# Patient Record
Sex: MALE | Race: BLACK OR AFRICAN AMERICAN | NOT HISPANIC OR LATINO | Employment: FULL TIME | URBAN - METROPOLITAN AREA
[De-identification: names, ages, dates, MRNs, and addresses within clinical notes are randomized per-mention and may not be internally consistent; named-entity substitution may affect disease eponyms.]

---

## 2023-08-31 ENCOUNTER — OFFICE VISIT (OUTPATIENT)
Dept: PRIMARY CARE | Facility: CLINIC | Age: 38
End: 2023-08-31
Payer: COMMERCIAL

## 2023-08-31 VITALS
WEIGHT: 162 LBS | HEART RATE: 63 BPM | BODY MASS INDEX: 25.43 KG/M2 | SYSTOLIC BLOOD PRESSURE: 131 MMHG | HEIGHT: 67 IN | DIASTOLIC BLOOD PRESSURE: 69 MMHG

## 2023-08-31 DIAGNOSIS — R79.89 ABNORMAL TSH: ICD-10-CM

## 2023-08-31 DIAGNOSIS — F84.0 AUTISTIC DISORDER (HHS-HCC): ICD-10-CM

## 2023-08-31 DIAGNOSIS — F32.5 MAJOR DEPRESSIVE DISORDER WITH SINGLE EPISODE, IN REMISSION (CMS-HCC): ICD-10-CM

## 2023-08-31 DIAGNOSIS — E78.5 HYPERLIPIDEMIA, UNSPECIFIED HYPERLIPIDEMIA TYPE: ICD-10-CM

## 2023-08-31 DIAGNOSIS — J30.1 ALLERGIC RHINITIS DUE TO POLLEN, UNSPECIFIED SEASONALITY: ICD-10-CM

## 2023-08-31 DIAGNOSIS — Z00.00 HEALTH CARE MAINTENANCE: Primary | ICD-10-CM

## 2023-08-31 PROBLEM — M22.10 RECURRENT SUBLUXATION OF PATELLA: Status: ACTIVE | Noted: 2023-08-31

## 2023-08-31 PROBLEM — J30.9 ALLERGIC RHINITIS: Status: ACTIVE | Noted: 2023-08-31

## 2023-08-31 PROBLEM — M22.12 PATELLOFEMORAL INSTABILITY, LEFT: Status: ACTIVE | Noted: 2023-08-31

## 2023-08-31 PROBLEM — F41.9 ANXIETY: Status: ACTIVE | Noted: 2023-08-31

## 2023-08-31 PROCEDURE — 1036F TOBACCO NON-USER: CPT | Performed by: INTERNAL MEDICINE

## 2023-08-31 PROCEDURE — 99213 OFFICE O/P EST LOW 20 MIN: CPT | Performed by: INTERNAL MEDICINE

## 2023-08-31 RX ORDER — CITALOPRAM 20 MG/1
20 TABLET, FILM COATED ORAL DAILY
COMMUNITY

## 2023-08-31 RX ORDER — QUETIAPINE FUMARATE 50 MG/1
50 TABLET, FILM COATED ORAL DAILY
COMMUNITY
Start: 2023-02-06

## 2023-08-31 RX ORDER — FLUTICASONE PROPIONATE 50 MCG
1 SPRAY, SUSPENSION (ML) NASAL DAILY
Qty: 16 G | Refills: 5 | Status: SHIPPED | OUTPATIENT
Start: 2023-08-31 | End: 2024-08-30

## 2023-08-31 ASSESSMENT — PATIENT HEALTH QUESTIONNAIRE - PHQ9
2. FEELING DOWN, DEPRESSED OR HOPELESS: NOT AT ALL
SUM OF ALL RESPONSES TO PHQ9 QUESTIONS 1 AND 2: 0
1. LITTLE INTEREST OR PLEASURE IN DOING THINGS: NOT AT ALL

## 2023-08-31 NOTE — PROGRESS NOTES
"Subjective   Patient ID: Percy Pruitt is a 38 y.o. male who presents for [facility].    HPI     Patient is a 38-year-old male with past medical history of autism anxiety and depression who follows with psychiatry every 4 months who presents to [facility].  Patient has a history of allergic rhinitis and is requesting refills of his Flonase.  He denies any watery eyes and he states that for the most part his symptoms are well controlled.  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  He lives independently however he is mother is present to assist with care.  He does not have a guardian at this time.  His mother wants him to be independent as possible but is there to help with decisions.  He denies any chest pain palpitations nausea vomiting diarrhea or constipation    Review of Systems  Constitutional: No fever or chills  Cardiovascular: no chest pain, no palpitations and no syncope.   Respiratory: no cough, no shortness of breath during exertion and no shortness of breath at rest.   Gastrointestinal: no abdominal pain, no nausea and no vomiting.  Neuro: No Headache, no dizziness    Objective   /69   Pulse 63   Ht 1.702 m (5' 7\")   Wt 73.5 kg (162 lb)   BMI 25.37 kg/m²     Physical Exam  Constitutional: Alert and in no acute distress. Well developed, well nourished  Head and Face: Head and face: Normal.    Cardiovascular: Heart rate and rhythm were normal, normal S1 and S2. No peripheral edema.   Pulmonary: No respiratory distress. Clear bilateral breath sounds.  Musculoskeletal: Gait and station: Normal. Muscle strength/tone: Normal.   Skin: Normal skin color and pigmentation, normal skin turgor, and no rash.    Psychiatric: Judgment and insight: Intact. Mood and affect: Normal.        Lab Results   Component Value Date    WBC 8.7 10/01/2021    HGB 15.7 10/01/2021    HCT 49.0 10/01/2021     10/01/2021    CHOL 215 (H) 05/30/2019    TRIG 79 05/30/2019 "    HDL 49.6 05/30/2019    LDLDIRECT 135 (H) 10/01/2021    ALT 14 05/30/2019    AST 22 05/30/2019     10/01/2021    K 4.7 10/01/2021     10/01/2021    CREATININE 1.09 10/01/2021    BUN 10 10/01/2021    CO2 30 10/01/2021    TSH 0.53 10/01/2021    HGBA1C 5.2 10/01/2021       XR KNEE COMPLETE 4 OR MORE VIEWS  Exam Type:  KNEE CMPLT, 4 OR MORE VIEWS   Date and Time: 7/21/2016 4:02 PM   Clinical information: Signs/Symptoms: left knee pain at patella. no   surgery no trauma   Comparison: None.        FINDINGS:     No joint effusion.  Tibial spines are prominent.  No fracture or   dislocation.  No radiopaque foreign bodies in the soft tissues.    Patellofemoral compartment is normal in appearance.     IMPRESSION:     1. Essentially negative study.            Assessment/Plan   Problem List Items Addressed This Visit       Autistic disorder    Allergic rhinitis     Refill Flonase to be used once daily         Relevant Medications    fluticasone (Flonase) 50 mcg/actuation nasal spray    Abnormal TSH     Slightly elevated 5 years ago.  We will recheck at this time.  Clinically euthyroid         Hyperlipidemia     Check fasting LDL  Encourage Mediterranean diet and regular exercise         Major depressive disorder with single episode, in remission (CMS/HCC)     Continue following with psychiatry.  On Seroquel and Celexa.  Mood stable.            Other Visit Diagnoses       Health care maintenance    -  Primary    Relevant Orders    CBC    Comprehensive metabolic panel    Lipid Panel    TSH with reflex to Free T4 if abnormal

## 2023-09-01 LAB
ALANINE AMINOTRANSFERASE (SGPT) (U/L) IN SER/PLAS: 18 U/L (ref 10–52)
ALBUMIN (G/DL) IN SER/PLAS: 4.7 G/DL (ref 3.4–5)
ALKALINE PHOSPHATASE (U/L) IN SER/PLAS: 60 U/L (ref 33–120)
ANION GAP IN SER/PLAS: 14 MMOL/L (ref 10–20)
ASPARTATE AMINOTRANSFERASE (SGOT) (U/L) IN SER/PLAS: 20 U/L (ref 9–39)
BILIRUBIN TOTAL (MG/DL) IN SER/PLAS: 0.9 MG/DL (ref 0–1.2)
CALCIUM (MG/DL) IN SER/PLAS: 10.4 MG/DL (ref 8.6–10.6)
CARBON DIOXIDE, TOTAL (MMOL/L) IN SER/PLAS: 29 MMOL/L (ref 21–32)
CHLORIDE (MMOL/L) IN SER/PLAS: 102 MMOL/L (ref 98–107)
CHOLESTEROL (MG/DL) IN SER/PLAS: 217 MG/DL (ref 0–199)
CHOLESTEROL IN HDL (MG/DL) IN SER/PLAS: 61.2 MG/DL
CHOLESTEROL/HDL RATIO: 3.5
CREATININE (MG/DL) IN SER/PLAS: 1.09 MG/DL (ref 0.5–1.3)
ERYTHROCYTE DISTRIBUTION WIDTH (RATIO) BY AUTOMATED COUNT: 13.5 % (ref 11.5–14.5)
ERYTHROCYTE MEAN CORPUSCULAR HEMOGLOBIN CONCENTRATION (G/DL) BY AUTOMATED: 31.9 G/DL (ref 32–36)
ERYTHROCYTE MEAN CORPUSCULAR VOLUME (FL) BY AUTOMATED COUNT: 83 FL (ref 80–100)
ERYTHROCYTES (10*6/UL) IN BLOOD BY AUTOMATED COUNT: 6.18 X10E12/L (ref 4.5–5.9)
GFR MALE: 90 ML/MIN/1.73M2
GLUCOSE (MG/DL) IN SER/PLAS: 80 MG/DL (ref 74–99)
HEMATOCRIT (%) IN BLOOD BY AUTOMATED COUNT: 51.4 % (ref 41–52)
HEMOGLOBIN (G/DL) IN BLOOD: 16.4 G/DL (ref 13.5–17.5)
LDL: 141 MG/DL (ref 0–99)
LEUKOCYTES (10*3/UL) IN BLOOD BY AUTOMATED COUNT: 6.8 X10E9/L (ref 4.4–11.3)
NRBC (PER 100 WBCS) BY AUTOMATED COUNT: 0 /100 WBC (ref 0–0)
PLATELETS (10*3/UL) IN BLOOD AUTOMATED COUNT: 184 X10E9/L (ref 150–450)
POTASSIUM (MMOL/L) IN SER/PLAS: 4.7 MMOL/L (ref 3.5–5.3)
PROTEIN TOTAL: 7.6 G/DL (ref 6.4–8.2)
SODIUM (MMOL/L) IN SER/PLAS: 140 MMOL/L (ref 136–145)
THYROTROPIN (MIU/L) IN SER/PLAS BY DETECTION LIMIT <= 0.05 MIU/L: 0.84 MIU/L (ref 0.44–3.98)
TRIGLYCERIDE (MG/DL) IN SER/PLAS: 76 MG/DL (ref 0–149)
UREA NITROGEN (MG/DL) IN SER/PLAS: 9 MG/DL (ref 6–23)
VLDL: 15 MG/DL (ref 0–40)

## 2023-09-01 PROCEDURE — 85027 COMPLETE CBC AUTOMATED: CPT

## 2023-09-01 PROCEDURE — 80053 COMPREHEN METABOLIC PANEL: CPT

## 2023-09-01 PROCEDURE — 84443 ASSAY THYROID STIM HORMONE: CPT

## 2023-09-01 PROCEDURE — 80061 LIPID PANEL: CPT

## 2024-03-21 ENCOUNTER — OFFICE VISIT (OUTPATIENT)
Dept: PRIMARY CARE | Facility: CLINIC | Age: 39
End: 2024-03-21
Payer: COMMERCIAL

## 2024-03-21 VITALS
WEIGHT: 156 LBS | DIASTOLIC BLOOD PRESSURE: 64 MMHG | SYSTOLIC BLOOD PRESSURE: 111 MMHG | BODY MASS INDEX: 24.43 KG/M2 | OXYGEN SATURATION: 97 % | HEART RATE: 60 BPM

## 2024-03-21 DIAGNOSIS — F84.0 AUTISTIC DISORDER (HHS-HCC): ICD-10-CM

## 2024-03-21 DIAGNOSIS — E78.5 HYPERLIPIDEMIA, UNSPECIFIED HYPERLIPIDEMIA TYPE: Primary | ICD-10-CM

## 2024-03-21 DIAGNOSIS — F32.5 MAJOR DEPRESSIVE DISORDER WITH SINGLE EPISODE, IN REMISSION (CMS-HCC): ICD-10-CM

## 2024-03-21 PROCEDURE — 99395 PREV VISIT EST AGE 18-39: CPT | Performed by: INTERNAL MEDICINE

## 2024-03-21 PROCEDURE — 1036F TOBACCO NON-USER: CPT | Performed by: INTERNAL MEDICINE

## 2024-03-21 RX ORDER — ATORVASTATIN CALCIUM 20 MG/1
20 TABLET, FILM COATED ORAL DAILY
Qty: 100 TABLET | Refills: 3 | Status: SHIPPED | OUTPATIENT
Start: 2024-03-21 | End: 2025-04-25

## 2024-03-21 NOTE — PROGRESS NOTES
Subjective   Patient ID: Percy Pruitt is a 38 y.o. male who presents for Follow-up.          HPI     Patient is a 38-year-old male with past medical history of autism anxiety and depression Asperger and audism  who follows with psychiatry every 4 months who presents for wellness.    No specific complaints at this time.  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  Lives independently at home.  His mother is present to assist with needs if needed.  He pays his own bills.      His cholesterol has been elevated over the years and his grandmother was diagnosed with early heart disease.  His mother also has history of heart disease.  No shortness of breath on exertion and no chest pain.  He otherwise takes his medications as prescribed and feels like the psychiatric medications are helping him.    No suicidal homicidal ideation.  Labs are up-to-date.  He does not exercise regularly.  He denies illicit substances smoking or alcohol    Review of Systems  Constitutional: No fever or chills, No Night Sweats  Eyes: No Blurry Vision or Eye sight problems  ENT: No Nasal Discharge, Hoarseness, sore throat  Cardiovascular: no chest pain, no palpitations and no syncope.   Respiratory: no cough, no shortness of breath during exertion and no shortness of breath at rest.   Gastrointestinal: no abdominal pain, no nausea and no vomiting.   : No issues with urinary stream, burning with urination, no blood in urine or stools  Skin: No Skin rashes or Lesions  Neuro: No Headache, no dizziness or Numbness or tingling  Psych: No Anxiety, depression or sleeping problems  Heme: No Easy bleeding or brusing.     Objective   /64   Pulse 60   Wt 70.8 kg (156 lb)   SpO2 97%   BMI 24.43 kg/m²     Physical Exam  Constitutional: Alert and in no acute distress. Well developed, well nourished.   Head and Face: Head and face: Normal.    Eyes: Normal external exam. Pupils were equal in size,  round, reactive to light (PERRL) with normal accommodation and extraocular movements intact (EOMI).   Ears, Nose, Mouth, and Throat: External inspection of ears and nose: Normal.  Hearing: Normal.  Nasal mucosa, septum, and turbinates: Normal.  Lips, teeth, and gums: Normal.  Oropharynx: Normal.   Neck: No neck mass was observed. Supple. Thyroid not enlarged and there were no palpable thyroid nodules.   Cardiovascular: Heart rate and rhythm were normal, normal S1 and S2. Pedal pulses: Normal. No peripheral edema.   Pulmonary: No respiratory distress. Clear bilateral breath sounds.   Abdomen: Soft nontender; no abdominal mass palpated. Normal bowel sounds. No organomegaly.   : Deferred  Musculoskeletal: No joint swelling seen, normal movements of all extremities. Range of motion: Normal.  Muscle strength/tone: Normal.    Skin: Normal skin color and pigmentation, normal skin turgor, and no rash.   Neurologic: Deep tendon reflexes were 2+ and symmetric.   Psychiatric: Judgment and insight: Intact. Mood and affect: Normal.  Lymphatic: No cervical lymphadenopathy. Palpation of lymph nodes in axillae: Normal.  Palpation of lymph nodes in groin: Normal.    Lab Results   Component Value Date    WBC 6.8 09/01/2023    HGB 16.4 09/01/2023    HCT 51.4 09/01/2023     09/01/2023    CHOL 217 (H) 09/01/2023    TRIG 76 09/01/2023    HDL 61.2 09/01/2023    LDLDIRECT 135 (H) 10/01/2021    ALT 18 09/01/2023    AST 20 09/01/2023     09/01/2023    K 4.7 09/01/2023     09/01/2023    CREATININE 1.09 09/01/2023    BUN 9 09/01/2023    CO2 29 09/01/2023    TSH 0.84 09/01/2023    HGBA1C 5.2 10/01/2021       XR KNEE COMPLETE 4 OR MORE VIEWS  Exam Type:  KNEE CMPLT, 4 OR MORE VIEWS   Date and Time: 7/21/2016 4:02 PM   Clinical information: Signs/Symptoms: left knee pain at patella. no   surgery no trauma   Comparison: None.        FINDINGS:     No joint effusion.  Tibial spines are prominent.  No fracture or   dislocation.   No radiopaque foreign bodies in the soft tissues.    Patellofemoral compartment is normal in appearance.     IMPRESSION:     1. Essentially negative study.      Assessment/Plan   Problem List Items Addressed This Visit             ICD-10-CM    Autistic disorder F84.0    Hyperlipidemia - Primary E78.5     Start statin and advised Mediterranean diet         Relevant Medications    atorvastatin (Lipitor) 20 mg tablet    Major depressive disorder with single episode, in remission (CMS/HCC) F32.5     Symptoms well-controlled and following with psychiatry on a regular basis.              Dear Percy Pruitt     It was my pleasure to take care of you today in the office. Below are the things we discussed today:    1. Immunizations: Yearly Flu shot is recommended.          a: Tetanus: Up-to-date          2. Blood Work: Ordered  3. Seen your dentist twice a year  4. Yearly Eye exam is recommended    5. BMI: 24.4  6: Diet recommendations:   Eat Clean, Try to have as many home cooked meals as possible  Avoid processed foods which contain excess calories, sugar, and sodium.    7. Exercise recommendations:   150 minutes a week to maintain your weight     If you have to loose weight, you need a better diet and exercise plan.     8. Please get your Living will / Advance directive completed if you do not have one already. Please make sure our office has a copy of the latest one.     9. Colonoscopy: Start at age 45  10. PSA: Start at age 50    11.     Follow up in one year for a Physical. Please call the office before your Physical to see if you need blood work completed prior to your physical.     Please call me if any questions arise from now until your next visit. I will call you after I am done seeing patients. A Doctor is always available by phone when the office is closed. Please feel free to call for help with any problem that you feel shouldn't wait until the office re-opens.     Melo Jolly, DO

## 2024-11-14 ENCOUNTER — LAB (OUTPATIENT)
Dept: LAB | Facility: LAB | Age: 39
End: 2024-11-14
Payer: COMMERCIAL

## 2024-11-14 DIAGNOSIS — Z51.81 ENCOUNTER FOR THERAPEUTIC DRUG LEVEL MONITORING: Primary | ICD-10-CM

## 2024-11-14 LAB
CHOLEST SERPL-MCNC: 201 MG/DL (ref 0–199)
CHOLESTEROL/HDL RATIO: 3.3
EST. AVERAGE GLUCOSE BLD GHB EST-MCNC: 111 MG/DL
HBA1C MFR BLD: 5.5 %
HDLC SERPL-MCNC: 61.8 MG/DL
LDLC SERPL CALC-MCNC: 128 MG/DL
NON HDL CHOLESTEROL: 139 MG/DL (ref 0–149)
TRIGL SERPL-MCNC: 58 MG/DL (ref 0–149)
VLDL: 12 MG/DL (ref 0–40)

## 2024-11-14 PROCEDURE — 80061 LIPID PANEL: CPT

## 2024-11-14 PROCEDURE — 83036 HEMOGLOBIN GLYCOSYLATED A1C: CPT

## 2024-11-14 PROCEDURE — 36415 COLL VENOUS BLD VENIPUNCTURE: CPT

## 2025-03-27 ENCOUNTER — APPOINTMENT (OUTPATIENT)
Dept: PRIMARY CARE | Facility: CLINIC | Age: 40
End: 2025-03-27
Payer: COMMERCIAL